# Patient Record
Sex: MALE | Race: WHITE | NOT HISPANIC OR LATINO | Employment: FULL TIME | URBAN - METROPOLITAN AREA
[De-identification: names, ages, dates, MRNs, and addresses within clinical notes are randomized per-mention and may not be internally consistent; named-entity substitution may affect disease eponyms.]

---

## 2023-12-29 ENCOUNTER — TELEPHONE (OUTPATIENT)
Dept: ENDOCRINOLOGY | Facility: CLINIC | Age: 68
End: 2023-12-29
Payer: MEDICARE

## 2023-12-29 NOTE — TELEPHONE ENCOUNTER
"S/w pt, states he's not being seen for "fever".  Having hot flashes and flushing.  Appt sched for next avail with Dr. Couch.  Pt verb understanding.   "

## 2023-12-29 NOTE — TELEPHONE ENCOUNTER
----- Message from Mariann Alfredo sent at 12/29/2023  9:58 AM CST -----  Good afternoon,     Patient has a referral in for fevers. Please call patient to schedule. I was unable to find an appointment.     Thank you,   Mariann YADAV   [facility]